# Patient Record
Sex: FEMALE | Race: WHITE
[De-identification: names, ages, dates, MRNs, and addresses within clinical notes are randomized per-mention and may not be internally consistent; named-entity substitution may affect disease eponyms.]

---

## 2018-07-27 ENCOUNTER — HOSPITAL ENCOUNTER (EMERGENCY)
Dept: HOSPITAL 74 - JERFT | Age: 78
Discharge: HOME | End: 2018-07-27
Payer: COMMERCIAL

## 2018-07-27 VITALS — HEART RATE: 86 BPM | SYSTOLIC BLOOD PRESSURE: 118 MMHG | TEMPERATURE: 97.8 F | DIASTOLIC BLOOD PRESSURE: 57 MMHG

## 2018-07-27 VITALS — BODY MASS INDEX: 26.2 KG/M2

## 2018-07-27 DIAGNOSIS — I10: ICD-10-CM
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
## 2018-07-27 DIAGNOSIS — S00.31XA: ICD-10-CM
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
## 2018-07-27 DIAGNOSIS — S52.592A: Primary | ICD-10-CM

## 2018-07-27 PROCEDURE — 2W3DX1Z IMMOBILIZATION OF LEFT LOWER ARM USING SPLINT: ICD-10-PCS

## 2018-07-27 NOTE — PDOC
Rapid Medical Evaluation


Time Seen by Provider: 07/27/18 16:04


Medical Evaluation: 





07/27/18 16:04


I have performed a brief in-person evaluation of this patient.





The patient presents with a chief complaint of: L wrist pain and facial 

abrasion s/p mechanical fall today. No LOC, ha, dizziness, n/v. Not on blood 

thinners. No hip/neck/pain. Ambulatory in triage. No CP or dizziness prior to 

fall





Pertinent physical exam findings:facial abrasion to L side of face, LUE w/ some 

swelling over dorsal aspect of distal L forearm





I have ordered the following:CTH and xray





The patient will proceed to the ED for further evaluation











**Discharge Disposition





- Diagnosis


Fall


Qualifiers:


 Encounter type: initial encounter Qualified Code(s): W19.XXXA - Unspecified 

fall, initial encounter








- Referrals





- Patient Instructions





- Post Discharge Activity

## 2018-07-27 NOTE — PDOC
History of Present Illness





- General


Chief Complaint: Bone Injury


Stated Complaint: FALL, INJURY


Time Seen by Provider: 07/27/18 16:04


History Source: Patient


Exam Limitations: No Limitations





- History of Present Illness


Initial Comments: 





07/27/18 16:35


77 yr female tripped and fell this am injured left wrist and left side face. no 

LOC no dizzyness. 


Occurred: reports: this morning


Severity: reports: mild


Pain Location: reports: upper extremity (left wrist)


Method of Injury: Yes: fall





Past History





- Past Medical History


Allergies/Adverse Reactions: 


 Allergies











Allergy/AdvReac Type Severity Reaction Status Date / Time


 


No Known Allergies Allergy   Verified 07/27/18 16:47











Home Medications: 


Ambulatory Orders





Metoprolol Succinate 25 mg PO ASDIR 07/27/18 








COPD: No


HTN: Yes





- Suicide/Smoking/Psychosocial Hx


Smoking History: Former smoker


Have you smoked in the past 12 months: No


If you are a former smoker, when did you quit?: 8 years ago


Information on smoking cessation initiated: No


Hx Alcohol Use: No


Drug/Substance Use Hx: No





**Review of Systems





- Review of Systems


Able to Perform ROS?: Yes


Is the patient limited English proficient: No


Constitutional: No: Symptoms Reported


HEENTM: No: Symptoms Reported


Respiratory: No: Symptoms reported


Cardiac (ROS): No: Symptoms Reported


ABD/GI: No: Symptoms Reported


Musculoskeletal: Yes: Symptoms Reported





*Physical Exam





- Vital Signs


 Last Vital Signs











Temp Pulse Resp BP Pulse Ox


 


 97.8 F   86   16   118/57   96 


 


 07/27/18 16:07  07/27/18 16:07  07/27/18 16:07  07/27/18 16:07  07/27/18 16:07














- Physical Exam


General Appearance: Yes: Nourished, Appropriately Dressed


HEENT: positive: EOMI, JEANNINE, TMs Normal, Pharynx Normal


Neck: positive: Supple.  negative: Lymphadenopathy (R), Lymphadenopathy (L), 

Tender lateral


Respiratory/Chest: positive: Lungs Clear, Normal Breath Sounds.  negative: 

Chest Tender, Stridor, Wheezing


Cardiovascular: positive: Regular Rhythm, Regular Rate


Extremity: positive: Normal Capillary Refill, Normal Inspection, Tender, 

Swelling (left wrist stronf radial pulse limited ROM )


Integumentary: positive: Normal Color, Dry, Warm, Bruising, Other (abrasions 

left cheek, left upper eyeborw )





Procedures





- Splinting


Hand-Made Type: orthoglass (left wirst volar/dorsal splint placed above elbow)





Medical Decision Making





- Medical Decision Making





07/27/18 16:49


cc: mechanical fall on tree root on sidewalk this am no loc


pt has applied ice to the left side of face cleaned with peroxide and appied 

bacitracin at home


pt has pain 7/10 to the left wrist with minimal movement





xrays done, head ct ordered, 


07/27/18 18:08


ct done is negative


dc inst given to pt who understands the plan of care all questions asked and 

answered. 





*DC/Admit/Observation/Transfer


Diagnosis at time of Disposition: 


 Abrasion, face w/o infection





Fall


Qualifiers:


 Encounter type: initial encounter Qualified Code(s): W19.XXXA - Unspecified 

fall, initial encounter





Wrist fracture, left


Qualifiers:


 Encounter type: initial encounter Fracture type: closed Qualified Code(s): 

S62.102A - Fracture of unspecified carpal bone, left wrist, initial encounter 

for closed fracture








- Discharge Dispostion


Disposition: HOME


Condition at time of disposition: Good





- Referrals


Referrals: 


Daquan Akhtar MD [Staff Physician] - 





- Patient Instructions


Additional Instructions: 


call  's office on Monday for appointment next week 


do not remove the splint or get it wet


use the sling while awake remove to sleep and bathe 


take tylenol 650mg every 4-6hrs for pain 


follow with your primary care doctor on MONDAY 





- Post Discharge Activity

## 2019-09-26 ENCOUNTER — HOSPITAL ENCOUNTER (OUTPATIENT)
Dept: HOSPITAL 74 - JER | Age: 79
Setting detail: OBSERVATION
Discharge: HOME | End: 2019-09-26
Attending: INTERNAL MEDICINE | Admitting: INTERNAL MEDICINE
Payer: COMMERCIAL

## 2019-09-26 VITALS — BODY MASS INDEX: 24.8 KG/M2

## 2019-09-26 VITALS — HEART RATE: 78 BPM | TEMPERATURE: 98.3 F | SYSTOLIC BLOOD PRESSURE: 119 MMHG | DIASTOLIC BLOOD PRESSURE: 55 MMHG

## 2019-09-26 DIAGNOSIS — Z87.891: ICD-10-CM

## 2019-09-26 DIAGNOSIS — I10: ICD-10-CM

## 2019-09-26 DIAGNOSIS — E78.5: ICD-10-CM

## 2019-09-26 DIAGNOSIS — J44.1: Primary | ICD-10-CM

## 2019-09-26 LAB
ALBUMIN SERPL-MCNC: 3.2 G/DL (ref 3.4–5)
ALP SERPL-CCNC: 96 U/L (ref 45–117)
ALT SERPL-CCNC: 23 U/L (ref 13–61)
ANION GAP SERPL CALC-SCNC: 9 MMOL/L (ref 8–16)
AST SERPL-CCNC: 38 U/L (ref 15–37)
BASOPHILS # BLD: 0.5 % (ref 0–2)
BILIRUB SERPL-MCNC: 0.6 MG/DL (ref 0.2–1)
BNP SERPL-MCNC: 332.4 PG/ML (ref 5–450)
BUN SERPL-MCNC: 22.9 MG/DL (ref 7–18)
CALCIUM SERPL-MCNC: 9.2 MG/DL (ref 8.5–10.1)
CHLORIDE SERPL-SCNC: 106 MMOL/L (ref 98–107)
CO2 SERPL-SCNC: 22 MMOL/L (ref 21–32)
CREAT SERPL-MCNC: 1.2 MG/DL (ref 0.55–1.3)
DEPRECATED RDW RBC AUTO: 14.1 % (ref 11.6–15.6)
EOSINOPHIL # BLD: 2.9 % (ref 0–4.5)
GLUCOSE SERPL-MCNC: 100 MG/DL (ref 74–106)
HCT VFR BLD CALC: 41 % (ref 32.4–45.2)
HGB BLD-MCNC: 13.3 GM/DL (ref 10.7–15.3)
INR BLD: 1.19 (ref 0.83–1.09)
LYMPHOCYTES # BLD: 14.8 % (ref 8–40)
MAGNESIUM SERPL-MCNC: 2 MG/DL (ref 1.8–2.4)
MCH RBC QN AUTO: 28 PG (ref 25.7–33.7)
MCHC RBC AUTO-ENTMCNC: 32.5 G/DL (ref 32–36)
MCV RBC: 86 FL (ref 80–96)
MONOCYTES # BLD AUTO: 13.4 % (ref 3.8–10.2)
NEUTROPHILS # BLD: 68.4 % (ref 42.8–82.8)
PH BLDV: 7.38 [PH] (ref 7.31–7.41)
PLATELET # BLD AUTO: 280 K/MM3 (ref 134–434)
PMV BLD: 7.4 FL (ref 7.5–11.1)
POTASSIUM SERPLBLD-SCNC: 4.2 MMOL/L (ref 3.5–5.1)
PROT SERPL-MCNC: 7.8 G/DL (ref 6.4–8.2)
PT PNL PPP: 14.1 SEC (ref 9.7–13)
RBC # BLD AUTO: 4.76 M/MM3 (ref 3.6–5.2)
SODIUM SERPL-SCNC: 137 MMOL/L (ref 136–145)
VENOUS PC02: 39.2 MMHG (ref 38–52)
VENOUS PO2: < 49 MMHG (ref 28–48)
WBC # BLD AUTO: 6.8 K/MM3 (ref 4–10)

## 2019-09-26 PROCEDURE — 3E0F7GC INTRODUCTION OF OTHER THERAPEUTIC SUBSTANCE INTO RESPIRATORY TRACT, VIA NATURAL OR ARTIFICIAL OPENING: ICD-10-PCS | Performed by: INTERNAL MEDICINE

## 2019-09-26 PROCEDURE — 3E033NZ INTRODUCTION OF ANALGESICS, HYPNOTICS, SEDATIVES INTO PERIPHERAL VEIN, PERCUTANEOUS APPROACH: ICD-10-PCS | Performed by: INTERNAL MEDICINE

## 2019-09-26 PROCEDURE — G0378 HOSPITAL OBSERVATION PER HR: HCPCS

## 2019-09-26 PROCEDURE — 3E0337Z INTRODUCTION OF ELECTROLYTIC AND WATER BALANCE SUBSTANCE INTO PERIPHERAL VEIN, PERCUTANEOUS APPROACH: ICD-10-PCS | Performed by: INTERNAL MEDICINE

## 2019-09-26 RX ADMIN — IPRATROPIUM BROMIDE AND ALBUTEROL SULFATE SCH AMP: .5; 3 SOLUTION RESPIRATORY (INHALATION) at 14:45

## 2019-09-26 RX ADMIN — IPRATROPIUM BROMIDE AND ALBUTEROL SULFATE SCH AMP: .5; 3 SOLUTION RESPIRATORY (INHALATION) at 14:15

## 2019-09-26 NOTE — PN
Teaching Attending Note


Name of Resident: Benson Stovall





ATTENDING PHYSICIAN STATEMENT





I saw and evaluated the patient.


I reviewed the resident's note and discussed the case with the resident.


I agree with the resident's findings and plan as documented with exceptions 

below.








SUBJECTIVE:


79 yof with PMHx of Ashthma, prior heavy smoker, COPD, (no prior h/o PNA/exac 

or hospitalization or need for home oxygen), HTN comes with progressive dyspnea 

with exertion, improved with rest. reports overall generalized chest tightness 

that improves with inhalers.


Also c/o seasonal allergies. No fevers, chills, cough, sputum, sick contacts, 

chest pain, leg swelling, weight gain, orthopnea, PND or new concerns. 


Currently feels much better after receiving treatment in the ED and eager to go 

home.


12 point ROS done, neg except above. 


last seen her pulmonologist Dr. Reyes 1 year ago. 





OBJECTIVE:


 Vital Signs











 Period  Temp  Pulse  Resp  BP Sys/Pryor  Pulse Ox


 


 Last 24 Hr  98.2 F    21  120/64  95-96








 Intake & Output











 09/23/19 09/24/19 09/25/19 09/26/19





 23:59 23:59 23:59 23:59


 


Weight    136 lb











GENERAL: Awake, alert, and fully oriented, in no acute distress, able to talk 

in full sentences, no use of accessory muscles of respiration. No dyspnea or 

tachypnea noted after ambulation in the ED, oxygenating well. 


HEAD: Normal with no signs of trauma.


EYES: Pupils equal, round and reactive to light, extraocular movements intact, 

sclera anicteric, conjunctiva clear. No lid lag.


EARS, NOSE, THROAT: Ears normal, nares patent, oropharynx clear without 

exudates. Moist mucous membranes.


NECK: Normal range of motion, supple without lymphadenopathy, JVD, or masses.


LUNGS: Breath sounds equal, clear to auscultation bilaterally. No wheezes, few 

basilar rales. No accessory muscle use.


HEART: Regular rate and rhythm, normal S1 and S2 without murmur, rub or gallop.


ABDOMEN: Soft, nontender, not distended, normoactive bowel sounds, no guarding, 

no rebound, no masses.  No hepatomegaly or  splenomegaly. 


MUSCULOSKELETAL: Normal range of motion at all joints. No bony deformities or 

tenderness. No CVA tenderness.


UPPER EXTREMITIES: 2+ pulses, warm, well-perfused. No cyanosis. No clubbing. No 

peripheral edema.


LOWER EXTREMITIES: 2+ pulses, warm, well-perfused. No calf tenderness. No 

peripheral edema. 


NEUROLOGICAL:  AAOX3, facial symmetry, tongue midline, EOMI, PERRL, power 5/5, 

sensation intact to light touch, Cranial nerves II-XII intact. Normal speech. 

Normal gait.


PSYCHIATRIC: Cooperative. Good eye contact. Appropriate mood and affect.


SKIN: Warm, dry, normal turgor, no rashes or lesions noted, normal capillary 

refill. 





 Home Medications











 Medication  Instructions  Recorded


 


Albuterol Sulfate [Proair Hfa] 8.5 gm IH Q6H PRN #1 hfa.aer.ad 09/26/19


 


Amlodipine Besylate [Norvasc -] 5 mg PO DAILY 09/26/19


 


Enalapril Maleate [Vasotec] 20 mg PO DAILY 09/26/19


 


Fluticasone/Vilanterol [Breo 1 each IH DAILY #1 blst.w.dev 09/26/19





Ellipta 100-25 Mcg INH]  


 


Metoprolol Succinate [Toprol Xl] 50 mg PO DAILY 09/26/19


 


Pravastatin Sodium 20 mg PO HS 09/26/19


 


Prednisone See Taper PO DAILY #20 tablet 09/26/19








Active Medications





Albuterol Sulfate (Ventolin 0.083% Nebulizer Soln -)  1 amp NEB Q4H PRN


   PRN Reason: SHORT OF BREATH/WHEEZING


Albuterol/Ipratropium (Duoneb -)  1 amp NEB RTID IVANA


Prednisone (Deltasone -)  40 mg PO DAILY [facility], 1928–1983





 Laboratory Results - last 24 hr











  09/26/19 09/26/19 09/26/19





  12:48 12:48 12:48


 


WBC  6.8  


 


RBC  4.76  


 


Hgb  13.3  


 


Hct  41.0  


 


MCV  86.0  


 


MCH  28.0  


 


MCHC  32.5  


 


RDW  14.1  


 


Plt Count  280  


 


MPV  7.4 L  


 


Absolute Neuts (auto)  4.7  


 


Neutrophils %  68.4  


 


Lymphocytes %  14.8  


 


Monocytes %  13.4 H  


 


Eosinophils %  2.9  


 


Basophils %  0.5  


 


Nucleated RBC %  0  


 


PT with INR   


 


INR   


 


VBG pH   


 


POC VBG pCO2   


 


POC VBG pO2   


 


VBG HCO3   


 


VBG O2 Sat (Sara)   


 


VBG Base Excess   


 


Sodium   137 


 


Potassium   4.2 


 


Chloride   106 


 


Carbon Dioxide   22 


 


Anion Gap   9 


 


BUN   22.9 H 


 


Creatinine   1.2 


 


Est GFR (CKD-EPI)AfAm   49.43 


 


Est GFR (CKD-EPI)NonAf   42.65 


 


Random Glucose   100 


 


Calcium   9.2 


 


Magnesium    2.0


 


Total Bilirubin   0.6 


 


AST   38 H 


 


ALT   23 


 


Alkaline Phosphatase   96 


 


Creatine Kinase    104


 


Troponin I    < 0.02


 


B-Natriuretic Peptide    332.4


 


Total Protein   7.8 


 


Albumin   3.2 L 














  09/26/19 09/26/19





  12:48 12:48


 


WBC  


 


RBC  


 


Hgb  


 


Hct  


 


MCV  


 


MCH  


 


MCHC  


 


RDW  


 


Plt Count  


 


MPV  


 


Absolute Neuts (auto)  


 


Neutrophils %  


 


Lymphocytes %  


 


Monocytes %  


 


Eosinophils %  


 


Basophils %  


 


Nucleated RBC %  


 


PT with INR  14.10 H 


 


INR  1.19 H 


 


VBG pH   7.38


 


POC VBG pCO2   39.2


 


POC VBG pO2   < 49 H


 


VBG HCO3   22.5 L


 


VBG O2 Sat (Sara)   66.4 L


 


VBG Base Excess   -1.9


 


Sodium  


 


Potassium  


 


Chloride  


 


Carbon Dioxide  


 


Anion Gap  


 


BUN  


 


Creatinine  


 


Est GFR (CKD-EPI)AfAm  


 


Est GFR (CKD-EPI)NonAf  


 


Random Glucose  


 


Calcium  


 


Magnesium  


 


Total Bilirubin  


 


AST  


 


ALT  


 


Alkaline Phosphatase  


 


Creatine Kinase  


 


Troponin I  


 


B-Natriuretic Peptide  


 


Total Protein  


 


Albumin  








CXR image and results reviewed


Prior CT chest from 2/2019 reviewed


EKG: NSR, PAC, no acute ST-T changes





ASSESSMENT AND PLAN:


79 yof with PMHx of asthma, COPD, prior heavy smoker, HTN, lost to pulmonary 

follow up, comes with seasonal allergies and progressive exertional dyspnea/

wheezing





-Exertional dyspnea, likely COPD exac+/- season allergies


-HTN


-Ex heavy smoker





Plan


Markedly improved after steroids and nebs in the ED. Currently asymptomatic. 


Oxygenating well, lung exam clear.


No fevers, or s/s concerning for infection


Imaging non concerning. 


Ambulating well in the ED


renew home albuterol/breo-ellipta.


Discussed with Dr. Reyes, 


will place on short steroid taper. 


Patient counseled on outpatient pulmonary follow up and PCP In 1 week


Presentation findings, improvement with nebs/steroids and overall presentation 

consistent with COPD rather than cardiac process.


patient feels well, wishing to go home.


d/c home with appropriate inhaler and steroid scripts


Discussed with patient, ED Rn. 


Total time spent 55 min.

## 2019-09-26 NOTE — DS
Physical Exam: 


SUBJECTIVE:  SEE H&P subjective. Pt wishing to go home and feels comfortable to 

do so.








OBJECTIVE:





 Vital Signs











 Period  Temp  Pulse  Resp  BP Sys/Pryor  Pulse Ox


 


 Last 24 Hr  98.2 F    21  120/64  95-96








PHYSICAL EXAM





GENERAL: Awake, alert, and fully oriented, in no acute distress. Able to speak 

in full sentences on RA


HEENT: NC/AT, EOMI, JAKOB, sclera anicteric, MMM, no nasal polyps


NECK: No JVD


LUNGS: CTA bilaterally including down to bases. Breathing at normal rate. No 

wheezes, and no crackles. No accessory muscle use. 93% SpO2 on RA. Ambulated 

with shortness of breath in ED


HEART: RRR, normal S1 and S2 without murmur 


ABDOMEN: Soft, NT/ND, normoactive bowel sounds, no guarding 


MUSCULOSKELETAL: No CVA tenderness. 


EXTREMITIES: 2+ pulses, warm, well-perfused. No calf tenderness. No peripheral 

edema. No clubbing. No cyanosis


PSYCHIATRIC: Cooperative. Good eye contact. Appropriate mood and affect.


SKIN: Warm, dry, no rashes or lesions noted 





LABS


 Laboratory Results - last 24 hr











  09/26/19 09/26/19 09/26/19





  12:48 12:48 12:48


 


WBC  6.8  


 


RBC  4.76  


 


Hgb  13.3  


 


Hct  41.0  


 


MCV  86.0  


 


MCH  28.0  


 


MCHC  32.5  


 


RDW  14.1  


 


Plt Count  280  


 


MPV  7.4 L  


 


Absolute Neuts (auto)  4.7  


 


Neutrophils %  68.4  


 


Lymphocytes %  14.8  


 


Monocytes %  13.4 H  


 


Eosinophils %  2.9  


 


Basophils %  0.5  


 


Nucleated RBC %  0  


 


PT with INR   


 


INR   


 


VBG pH   


 


POC VBG pCO2   


 


POC VBG pO2   


 


VBG HCO3   


 


VBG O2 Sat (Sara)   


 


VBG Base Excess   


 


Sodium   137 


 


Potassium   4.2 


 


Chloride   106 


 


Carbon Dioxide   22 


 


Anion Gap   9 


 


BUN   22.9 H 


 


Creatinine   1.2 


 


Est GFR (CKD-EPI)AfAm   49.43 


 


Est GFR (CKD-EPI)NonAf   42.65 


 


Random Glucose   100 


 


Calcium   9.2 


 


Magnesium    2.0


 


Total Bilirubin   0.6 


 


AST   38 H 


 


ALT   23 


 


Alkaline Phosphatase   96 


 


Creatine Kinase    104


 


Troponin I    < 0.02


 


B-Natriuretic Peptide    332.4


 


Total Protein   7.8 


 


Albumin   3.2 L 














  09/26/19 09/26/19





  12:48 12:48


 


WBC  


 


RBC  


 


Hgb  


 


Hct  


 


MCV  


 


MCH  


 


MCHC  


 


RDW  


 


Plt Count  


 


MPV  


 


Absolute Neuts (auto)  


 


Neutrophils %  


 


Lymphocytes %  


 


Monocytes %  


 


Eosinophils %  


 


Basophils %  


 


Nucleated RBC %  


 


PT with INR  14.10 H 


 


INR  1.19 H 


 


VBG pH   7.38


 


POC VBG pCO2   39.2


 


POC VBG pO2   < 49 H


 


VBG HCO3   22.5 L


 


VBG O2 Sat (Sara)   66.4 L


 


VBG Base Excess   -1.9


 


Sodium  


 


Potassium  


 


Chloride  


 


Carbon Dioxide  


 


Anion Gap  


 


BUN  


 


Creatinine  


 


Est GFR (CKD-EPI)AfAm  


 


Est GFR (CKD-EPI)NonAf  


 


Random Glucose  


 


Calcium  


 


Magnesium  


 


Total Bilirubin  


 


AST  


 


ALT  


 


Alkaline Phosphatase  


 


Creatine Kinase  


 


Troponin I  


 


B-Natriuretic Peptide  


 


Total Protein  


 


Albumin  





IMAGING:





CXR: 2 views of the chest reveal clear lungs, sharp angles, tortuous thoracic 

aorta but no sign of


infiltrate or failure. The angles are sharp. The bones and soft tissues are 

intact. There are


degenerative changes with wedging. The mediastinum has a similar configuration 

to 2/28/ 2019. Correlation recommended





ECG: [QTc 493ms] *** POOR DATA QUALITY, INTERPRETATION MAY BE ADVERSELY AFFECTED


SINUS RHYTHM WITH PREMATURE ATRIAL COMPLEXES


OTHERWISE NORMAL ECG


WHEN COMPARED WITH ECG OF 24-DEC-2009 20:57,


PREMATURE VENTRICULAR COMPLEXES ARE NO LONGER PRESENT


PREMATURE ATRIAL COMPLEXES ARE NOW PRESENT





HOSPITAL COURSE:





Date of Admission:09/26/19





Date of Discharge: 09/26/19





Short-term stay due to interval improvement from original assessment until now. 

See plan below as per original H&P:





--CAT score of 19, however only worsened by 2 points since prior months as 

described by patient


--Unknown PFTs for GOLD criteria





--Pt markedly improved exam after ED interventions


--CXR reviewed without any infiltrative processes or congestion noted; only 

hyperinflated lungs


--No fevers, WBC WNL, no respiratory acidosis





--Discussed with her pulmonologist who recommended d/c with f/u home and 

Prednisone short taper in interim


--Will renew Albuterol HFA and Breo-Elipta inhalers


--Pt advised on the importance of outpatient followup and recommended f/u 

within 1 week alongside with interval PFT monitoring


--Recommended flu vaccination this year on OP basis





Diet: Unrestricted





Case discussed with Dr. Vazquez and Dr. Reyes and ED physicians


Benson Stovall, DO - IM PGY-3





Minutes to complete discharge: 30





Discharge Summary


Problems reviewed: Yes


Reason For Visit: ACUTE EXACERBATION OF COPD


Current Active Problems





COPD (chronic obstructive pulmonary disease) (Acute)


COPD exacerbation (Acute)








Condition: Stable





- Instructions


Diet, Activity, Other Instructions: 


You were seen due to have increased shortness of breath related to your COPD. 

It is important to go to your follow-up appointments so you can be optimized 

with medications to feel better





MEDICATIONS:


   Please continue your Breo Elipta 1 inhalation per day (this was sent to your 

pharmacy) 


   please continue your ProAir AS NEEDED for shortness of breath 1-2 puffs 

every 4 hours


   Please continue your Enalapril 20mg daily


   Continue your Metoprolol 50mg daily


   Continue your Amlodipine 5mg daily


   Continue your Pravastatin 20mg at night





   You will be given a Prednisone taper. It is important to continue this to 

the end as stopping it abruptly could harm you.


      --First day: 4 tablets (40mg) once


      --Second day: 4 tablets (40mg) once


      --Third day: 3 tablets (30mg) once


      --Fourth day: 3 tablets (30mg) once


      --Fifth day: 2 tablets (20mg) once


      --Sixth day: 2 tablets (20mg) once


      --Seventh day: 1 tablet (10mg) once


      --Eighth day: 1 tablets (10mg) once


   --Stop on the ninth day--





Follow-ups:


   Please follow-up with Dr. Martinez in 1 week to update him on your condition


   Please follow-up with Dr. Reyes within 1 week to update your pulmonary 

function tests and medications regarding your COPD 





If you notice any fevers, chills, new cough with productive cough, chest pain, 

worsening breathing or any new concerns, please call 911 or come to the ED. 





Referrals: 


Júnior Martinez MD [Primary Care Provider] - 1 Week


Kyle Reyes MD [Staff Physician] - 1 Week


Disposition: HOME





- Home Medications


Comprehensive Discharge Medication List: 


Ambulatory Orders





Albuterol Sulfate [Proair Hfa] 8.5 gm IH Q6H PRN #1 hfa.aer.ad 09/26/19 


Amlodipine Besylate [Norvasc -] 5 mg PO DAILY 09/26/19 


Enalapril Maleate [Vasotec] 20 mg PO DAILY 09/26/19 


Fluticasone/Vilanterol [Breo Ellipta 100-25 Mcg INH] 1 each IH DAILY #1 

blst.w.dev 09/26/19 


Metoprolol Succinate [Toprol Xl] 50 mg PO DAILY 09/26/19 


Pravastatin Sodium 20 mg PO HS 09/26/19 


Prednisone See Taper PO DAILY #20 tablet 09/26/19 








This patient is new to me today: Yes


Date on this admission: 09/26/19


Emergency Visit: Yes


ED Registration Date: 09/26/19


Care time: The patient presented to the Emergency Department on the above date 

and was hospitalized for further evaluation of their emergent condition.


Critical Care patient: No





- Discharge Referral


Referred to [facility] No

## 2019-09-26 NOTE — PDOC
Documentation entered by Zena Gallagher SCRIBE, acting as scribe for Beatris Darby MD.








Beatris Darby MD:  This documentation has been prepared by the Elliott pardo Adrianna, SCRIBE, under my direction and personally reviewed by me in its 

entirety.  I confirm that the documentation accurately reflects all work, 

treatment, procedures, and medical decision making performed by me.  





Attending Attestation





- Resident


Resident Name: Johnny Ornelas





- ED Attending Attestation


I have performed the following: I have examined & evaluated the patient, The 

case was reviewed & discussed with the resident, I agree w/resident's findings 

& plan





- HPI


HPI: 


The patient is an 79 year old female, with a significant PMH of HTN,  COPD, HCL

, and childhood asthma presenting with progressively worsening SOB for one 

month. Patient endorses dyspnea on exertion, noting she cant walk for more 

than one block. Patient notes she developed dyspnea at rest, prompting her 

visit to the ED. She reports associated dry cough and chest pain upon exertion.


usually takes proair for COPD.





09/26/19 13:03








- Physicial Exam


PE: 


Agree with the resident's HPI and PE as documented in the electronic medical 

record.





NAD, well appearing, EOMI, PERRL, nl conjunctiva, anicteric; no jvd. neck 

supple. mildly tachypneic in 20s, +basilar crackles, diminished breath sounds 

throughout. tachycardic, no murmurs, abdomen soft nontender. Back nontender. 

VILLA x4, no focal neuro deficits. No peripheral edema. normal color for ethnicity

, no calf tenderness. WWP.





09/26/19 13:02








- Medical Decision Making





09/26/19 12:32


See HPI for details. Prior notes reviewed, including admissions, discharges and 

consultations. 


Vital signs reviewed, wnl. 


Vital Signs











Temp Pulse Resp BP Pulse Ox


 


 98.2 F      21 H  120/64   95 


 


 09/26/19 11:49     09/26/19 11:49  09/26/19 11:49  09/26/19 11:49








DDx SOB: ACS, PE, PTX, CHF, COPD exac, pulmonary edema, pleurisy, pneumonia, 

viral syndrome. effusion. anemia, electrolyte/metabolic derangements.





POCUS thoracic exam with primarily A line profile b/l, left focal B lines in 

the apex, unclear etiology, but not diffuse; no pleural effusions noted.


suggestive of more copd exacerbation.





laboratory results and imaging reviewed, basic labs and lytes wnl, 


CXR_copd lungs, no infiltrate/edema/effusion or opacity


Cardiac panel_neg, reassuring, doubt cardiac.


EKG normal sinus rhythm at 89 bpm, no interval abnormalities, narrow QRS, ST 

and T wave segments and morphology normal. 





ED course


-interventions: duonebs x3, solumedrol, reassess





Admit for COPD exacerbation.. Discussed results and management plan with pt and 

family member at bedside, agree with impression, treatment indications, 

recommendations and plan. hospitalist service for admission.





09/26/19 13:03





09/26/19 13:35








**Heart Score/ECG Review


  ** #1


ECG reviewed & interpreted by me at: 11:55


General ECG Interpretation: Sinus Rhythm, Normal Rate, Normal Intervals


Compared to previous ECG there are: Previous ECG unavail





09/26/19 12:34


EKG normal sinus rhythm at 89 bpm, no interval abnormalities, narrow QRS, ST 

and T wave segments and morphology normal.

## 2019-09-26 NOTE — EKG
Test Reason : 

Blood Pressure : ***/*** mmHG

Vent. Rate : 089 BPM     Atrial Rate : 089 BPM

   P-R Int : 194 ms          QRS Dur : 090 ms

    QT Int : 406 ms       P-R-T Axes : 091 -23 066 degrees

   QTc Int : 493 ms

 

*** POOR DATA QUALITY, INTERPRETATION MAY BE ADVERSELY AFFECTED

SINUS RHYTHM WITH PREMATURE ATRIAL COMPLEXES

OTHERWISE NORMAL ECG

WHEN COMPARED WITH ECG OF 24-DEC-2009 20:57,

PREMATURE VENTRICULAR COMPLEXES ARE NO LONGER PRESENT

PREMATURE ATRIAL COMPLEXES ARE NOW PRESENT

Confirmed by ARIS MEDEL MD ([id number]) on 9/26/2019 4:19:40 PM

 

Referred By:             Confirmed By:ARIS MEDEL MD

## 2019-09-26 NOTE — PDOC
History of Present Illness





- General


Chief Complaint: Shortness of Breath


Stated Complaint: ASTHMA


Time Seen by Provider: 09/26/19 12:00


History Source: Patient


Exam Limitations: No Limitations





- History of Present Illness


Initial Comments: 








Toshia Michaud is an 80 yo F w a hx of COPD, HTN, HCL, and childhood asthma 

who presents to the ER with shortness of breath which has been worsening over 

the past month. The patient states it came to a point where she cannot breathe 

at rest. She has been experiencing significant dyspnea with exertion over the 

course of the past month. Whenever she walks one block she becomes short of 

breath and also has chest pain. She states she has been requiring an extra 

pillow to sleep with lately. Patient endorses a dry cough over the past week as 

well. The patient denies currently experiencing any chest pain. Her only 

complaint at the present time is difficulty breathing. 





Denies recent fevers, chills, infections. Denies ankle/leg swelling. Denies 

having a hx of heart failure. Denies nausea vomiting, diaphoresis. Denies 

current chest pain. Denies abdominal or back pain. 





PCP: Ynes Martinez


PSH: None reported


Social Hx: Foermer smoker. Denies current smoking, drinking, or substance abuse


Allergies: NKDA, NKA











Past History





- Past Medical History


Allergies/Adverse Reactions: 


 Allergies











Allergy/AdvReac Type Severity Reaction Status Date / Time


 


No Known Allergies Allergy   Verified 09/26/19 15:02











Home Medications: 


Ambulatory Orders





Albuterol Sulfate [Proair Hfa] 8.5 gm IH Q6H PRN #1 hfa.aer.ad 09/26/19 


Amlodipine Besylate [Norvasc -] 5 mg PO DAILY 09/26/19 


Enalapril Maleate [Vasotec] 20 mg PO DAILY 09/26/19 


Fluticasone/Vilanterol [Breo Ellipta 100-25 Mcg INH] 1 each IH DAILY #1 

blst.w.dev 09/26/19 


Metoprolol Succinate [Toprol Xl] 50 mg PO DAILY 09/26/19 


Pravastatin Sodium 20 mg PO HS 09/26/19 


Prednisone See Taper PO DAILY #20 tablet 09/26/19 








Cancer: Yes


COPD: No


HTN: Yes





- Immunization History


Immunization Up to Date: Yes





- Psycho Social/Smoking Cessation Hx


Smoking History: Former smoker


Have you smoked in the past 12 months: No


If you are a former smoker, when did you quit?: 8 years ago


Information on smoking cessation initiated: No


Hx Alcohol Use: No


Drug/Substance Use Hx: No





**Review of Systems





- Review of Systems


Able to Perform ROS?: Yes


Comments:: 








CONSTITUTIONAL: 


Present: weakness, malaise


Absent: fever, chills, diaphoresis, loss of appetite


HEENT: 


Absent: rhinorrhea, nasal congestion, throat pain, throat swelling, difficulty 

swallowing, mouth swelling, ear pain, eye pain, visual Changes


CARDIOVASCULAR: 


Present: Chest pain


Absent: syncope, palpitations, irregular heart rate, lightheadedness, 

peripheral edema


RESPIRATORY: 


Present: cough, shortness of breath, dyspnea with exertion, orthopnea


Absent: wheezing, stridor, hemoptysis


GASTROINTESTINAL:


Absent: abdominal pain, abdominal distension, nausea, vomiting, diarrhea, 

constipation, melena, hematochezia


GENITOURINARY: 


Absent: dysuria, frequency, urgency, hesitancy, hematuria, flank pain, genital 

pain


MUSCULOSKELETAL: 


Absent: myalgia, arthralgia, joint swelling


SKIN: 


Absent: rash, itching, pallor


HEMATOLOGIC/IMMUNOLOGIC: 


Absent: easy bleeding, easy bruising, lymphadenopathy, frequent infections


ENDOCRINE:


Absent: unexplained weight gain, unexplained weight loss, heat intolerance, 

cold intolerance


NEUROLOGIC: 


Absent: headache, focal weakness or paresthesias, dizziness, unsteady gait, 

seizure, mental status changes, bladder or bowel incontinence


PSYCHIATRIC: 


Absent: anxiety, depression, suicidal or homicidal ideation, hallucinations.











*Physical Exam





- Vital Signs


 Last Vital Signs











Temp Pulse Resp BP Pulse Ox


 


 98.2 F      21 H  120/64   95 


 


 09/26/19 11:49     09/26/19 11:49  09/26/19 11:49  09/26/19 11:49














- Physical Exam


Comments: 








GENERAL:


Well developed, well nourished. Awake and alert. Mild distress.


HEENT:


Normocephalic, atraumatic. PERRLA, EOMI. No conjunctival pallor. Sclera are non-

icteric. Moist mucous membranes. Oropharynx is clear.


NECK: 


Supple. Full ROM. No JVD. No thyromegaly. No lymphadenopathy.


CARDIOVASCULAR:


Tachycardic rate. Regular rhythm. No murmurs, rubs, or gallops. Distal pulses 

are 2+ and symmetric. 


PULMONARY: 


There are bilateral crackles at the bases. No wheezing or rhonchi.


ABDOMINAL:


Soft. Non-tender. Non-distended. No rebound or guarding. No organomegaly. 

Normoactive bowel sounds. 


MUSCULOSKELETAL 


Normal range of motion at all joints. No bony deformities or tenderness. No CVA 

tenderness.


EXTREMITIES: 


1+ b/l edema. No cyanosis. No clubbing. No calf tenderness.


SKIN: 


Warm and dry. Normal capillary refill. No rashes. No jaundice. 


NEUROLOGICAL: 


Alert, awake, appropriate. Normal speech. Gait is normal without ataxia.


PSYCHIATRIC: 


Cooperative. Good eye contact. Appropriate mood and affect.











Procedures





- Bedside Ultrasound


Bedside Ultrasound: Lung


Other: Cardiac


Remarks: 








Lungs: Trace B-lines in right apex. Normal A-lines in both lung fields. Normal 

lung sliding. No pleural effusion. 





Cardiac: No focal wall motion abnormalities, no pericardial effusion, normal LV/

RV ratio

















ED Treatment Course





- LABORATORY


CBC & Chemistry Diagram: 


 09/26/19 12:48





 09/26/19 12:48





- RADIOLOGY


Radiograph Interpretation: 





CXR:  Chest: Wheezing. 2 views of the chest reveal clear lungs, sharp angles, 

tortuous thoracic aorta but no sign of infiltrate or failure. The angles are 

sharp. The bones and soft tissues are intact. There are degenerative changes 

with wedging. The mediastinum has a similar configuration to 2/28/2019. 

Correlation recommended 








Medical Decision Making





- Medical Decision Making





Toshia Michaud is an 80 yo F w a hx of COPD, HTN, HCL, and childhood asthma 

who presents to the ER with shortness of breath which has been worsening over 

the past month. The patient states it came to a point where she cannot breathe 

at rest. She has been experiencing significant dyspnea with exertion over the 

course of the past month. Whenever she walks one block she becomes short of 

breath and also has chest pain. She states she has been requiring an extra 

pillow to sleep with lately. Patient endorses a dry cough over the past week as 

well. The patient denies currently experiencing any chest pain. Her only 

complaint at the present time is difficulty breathing. 





VS: Patient is satting at 95% on RA


- Tachycardic to 115





DDx IBNLT: COPD vs CHF, less likely asthma. ACS/MI, electrolyte/metabolic 

disturbance, angina





Plan: Labs, urine, CXR, EKG, bedside POCUS heart, lung, admit to hospital





EKG: NS rate of 89, narrow complexes, normal axis, no hypertrophy, no ST 

elevations or depressions, no Q waves, no abnormal TWI's





Labs: Elevated BUN. Normal trop and BNP. 


- Hydrating w 1LNS





US - 


Lungs: Trace B-lines on the left. Normal A-lines, no pleural effusion. 


Cardio: No focal wall motion abnormalities. No pericardial effusion. Normal Rv/

Lv ratio. Grossly Normal echo. 





CXR: Unremarkable





Dispo: Admit to hospital for respiratory distress

















Discharge





- Discharge Information


Problems reviewed: Yes


Clinical Impression/Diagnosis: 


 COPD exacerbation





COPD (chronic obstructive pulmonary disease)


Qualifiers:


 COPD type: unspecified COPD Qualified Code(s): J44.9 - Chronic obstructive 

pulmonary disease, unspecified





Condition: Stable





- Admission


Yes





- Follow up/Referral





- Patient Discharge Instructions





- Post Discharge Activity

## 2019-09-26 NOTE — HP
CHIEF COMPLAINT: Shortness of breath


PCP: Dr. Martinez





HISTORY OF PRESENT ILLNESS:


   80 yo F with h/o of COPD (unknown GOLD stage), HTN, HLD who originally 

presented due to increased shortness of breath. Pt describes increased 

shortness of breath over the past 1-2 months. Her reason for ER visit today was 

due to waking up short of breath which caused her concern. She noticed how her 

daily activities are becoming somewhat limited such as going to the grocery 

store. In addition she notices how climbing one stair flight or walking one 

block will get her short of breath. She has seen Dr. Reyes 1 year prior 

for her pulmonology needs, however has not followed up because she didn't want 

to. Pt normally takes ProAir HFA PRN for shortness of breath, however she has 

been using it about 1-2 times per day and noticing diminishing returns. She has 

just run out of her ProAir within the past day or so as well. Pt also is 

supposed to be on Breo-Elipta, however pt again has ran out of her medications 

and not followed up with any doctors. Pt noted interrupted sleep and uses 2 

pillows when she is in bed as well. Pt notes clear productive cough however 

this is infrequent. Pt also reports seasonal allergies around now, has not been 

in contact with any ill people, and has not received the flu vaccination this 

year. 





Currently pt received Medrol 125mg x1 in the ED which she reports has made her 

feel better. She denies any lightheadedness, dizziness, blurred vision, 

headaches, rhinorrhea, change in frequency or colour of sputum, odynophagia, 

chest pain, palpitations, back pain, abdominal pain, dysuria, polyuria, 

hematuria, diarrhea, constipation, melena, weakness, numbness.








Recent Travel: Denies





PAST MEDICAL HISTORY:


   COPD (Unknown GOLD due to unknown PFTs)


   HTN


   HLD





PAST SURGICAL HISTORY:


   None





Social History:


Smoking: former smoker from [age]yo about 1PPD


Alcohol: Denies


Drugs: Denies


Retired, has a pet cockatiel (bird) at home, no environmental exposures noted





Allergies


No Known Allergies Allergy (Verified 09/26/19 15:02)


 


Family Hx: Noncontributory





HOME MEDICATIONS:


 Home Medications











 Medication  Instructions  Recorded


 


Albuterol Sulfate [Proair Hfa] 8.5 gm IH Q6H PRN #1 hfa.aer.ad 09/26/19


 


Amlodipine Besylate [Norvasc -] 5 mg PO DAILY 09/26/19


 


Enalapril Maleate [Vasotec] 20 mg PO DAILY 09/26/19


 


Fluticasone/Vilanterol [Breo 1 each IH DAILY #1 blst.w.dev 09/26/19





Ellipta 100-25 Mcg INH]  


 


Metoprolol Succinate [Toprol Xl] 50 mg PO DAILY 09/26/19


 


Pravastatin Sodium 20 mg PO HS 09/26/19


 


Prednisone See Taper PO DAILY #20 tablet 09/26/19








REVIEW OF SYSTEMS


As per HPI








PHYSICAL EXAMINATION


 Vital Signs - 24 hr











  09/26/19 09/26/19





  11:49 14:15


 


Temperature 98.2 F 


 


Respiratory 16 





Rate  


 


Blood Pressure 120/64 


 


O2 Sat by Pulse 95 96





Oximetry (%)  











GENERAL: Awake, alert, and fully oriented, in no acute distress. Able to speak 

in full sentences on RA


HEENT: NC/AT, EOMI, JAKOB, sclera anicteric, MMM, no nasal polyps


NECK: No JVD


LUNGS: CTA bilaterally including down to bases. Breathing at normal rate. No 

wheezes, and no crackles. No accessory muscle use. 93% SpO2 on RA. Ambulated 

with shortness of breath in ED


HEART: RRR, normal S1 and S2 without murmur 


ABDOMEN: Soft, NT/ND, normoactive bowel sounds, no guarding 


MUSCULOSKELETAL: No CVA tenderness. 


EXTREMITIES: 2+ pulses, warm, well-perfused. No calf tenderness. No peripheral 

edema. No clubbing. No cyanosis


PSYCHIATRIC: Cooperative. Good eye contact. Appropriate mood and affect.


SKIN: Warm, dry, no rashes or lesions noted 


 Laboratory Results











  09/26/19 09/26/19 09/26/19





  12:48 12:48 12:48


 


WBC  6.8  


 


RBC  4.76  


 


Hgb  13.3  


 


Hct  41.0  


 


MCV  86.0  


 


MCH  28.0  


 


MCHC  32.5  


 


RDW  14.1  


 


Plt Count  280  


 


MPV  7.4 L  


 


Absolute Neuts (auto)  4.7  


 


Neutrophils %  68.4  


 


Lymphocytes %  14.8  


 


Monocytes %  13.4 H  


 


Eosinophils %  2.9  


 


Basophils %  0.5  


 


Nucleated RBC %  0  


 


PT with INR   


 


INR   


 


VBG pH   


 


POC VBG pCO2   


 


POC VBG pO2   


 


VBG HCO3   


 


VBG O2 Sat (Sara)   


 


VBG Base Excess   


 


Sodium   137 


 


Potassium   4.2 


 


Chloride   106 


 


Carbon Dioxide   22 


 


Anion Gap   9 


 


BUN   22.9 H 


 


Creatinine   1.2 


 


Est GFR (CKD-EPI)AfAm   49.43 


 


Est GFR (CKD-EPI)NonAf   42.65 


 


Random Glucose   100 


 


Calcium   9.2 


 


Magnesium    2.0


 


Total Bilirubin   0.6 


 


AST   38 H 


 


ALT   23 


 


Alkaline Phosphatase   96 


 


Creatine Kinase    104


 


Troponin I    < 0.02


 


B-Natriuretic Peptide    332.4


 


Total Protein   7.8 


 


Albumin   3.2 L 














  09/26/19 09/26/19





  12:48 12:48


 


WBC  


 


RBC  


 


Hgb  


 


Hct  


 


MCV  


 


MCH  


 


MCHC  


 


RDW  


 


Plt Count  


 


MPV  


 


Absolute Neuts (auto)  


 


Neutrophils %  


 


Lymphocytes %  


 


Monocytes %  


 


Eosinophils %  


 


Basophils %  


 


Nucleated RBC %  


 


PT with INR  14.10 H 


 


INR  1.19 H 


 


VBG pH   7.38


 


POC VBG pCO2   39.2


 


POC VBG pO2   < 49 H


 


VBG HCO3   22.5 L


 


VBG O2 Sat (Sara)   66.4 L


 


VBG Base Excess   -1.9


 


Sodium  


 


Potassium  


 


Chloride  


 


Carbon Dioxide  


 


Anion Gap  


 


BUN  


 


Creatinine  


 


Est GFR (CKD-EPI)AfAm  


 


Est GFR (CKD-EPI)NonAf  


 


Random Glucose  


 


Calcium  


 


Magnesium  


 


Total Bilirubin  


 


AST  


 


ALT  


 


Alkaline Phosphatase  


 


Creatine Kinase  


 


Troponin I  


 


B-Natriuretic Peptide  


 


Total Protein  


 


Albumin  











ASSESSMENT/PLAN:


Chronic COPD with advancement of disease


HTN


Former smoker 


HLD





--CAT score of 19, however only worsened by 2 points since prior months as 

described by patient


--Unknown PFTs for GOLD criteria





--Pt markedly improved exam after ED interventions


--CXR reviewed without any infiltrative processes or congestion noted; only 

hyperinflated lungs


--No fevers, WBC WNL, no respiratory acidosis





--Discussed with her pulmonologist who recommended d/c with f/u home and 

Prednisone short taper in interim


--Will renew Albuterol HFA and Breo-Elipta inhalers


--Pt advised on the importance of outpatient followup and recommended f/u 

within 1 week alongside with interval PFT monitoring


--Recommended flu vaccination this year on OP basis





Diet: Unrestricted





Case discussed with Dr. Vazquez and Dr. Reyes and ED physicians


Benson Stovall, DO - IM PGY-3








Visit type





- Emergency Visit


Emergency Visit: Yes


ED Registration Date: 09/26/19


Care time: The patient presented to the Emergency Department on the above date 

and was hospitalized for further evaluation of their emergent condition.





- New Patient


This patient is new to me today: Yes


Date on this admission: 09/26/19





- Critical Care


Critical Care patient: No

## 2021-10-12 ENCOUNTER — HOSPITAL ENCOUNTER (OUTPATIENT)
Dept: HOSPITAL 74 - JASU-SURG | Age: 81
Discharge: HOME | End: 2021-10-12
Attending: OPHTHALMOLOGY
Payer: COMMERCIAL

## 2021-10-12 VITALS — DIASTOLIC BLOOD PRESSURE: 80 MMHG | SYSTOLIC BLOOD PRESSURE: 150 MMHG | HEART RATE: 88 BPM

## 2021-10-12 VITALS — BODY MASS INDEX: 25.3 KG/M2

## 2021-10-12 VITALS — TEMPERATURE: 98.8 F

## 2021-10-12 DIAGNOSIS — H26.9: Primary | ICD-10-CM

## 2021-10-12 PROCEDURE — 08RK3JZ REPLACEMENT OF LEFT LENS WITH SYNTHETIC SUBSTITUTE, PERCUTANEOUS APPROACH: ICD-10-PCS | Performed by: OPHTHALMOLOGY

## 2021-10-12 RX ADMIN — FLURBIPROFEN SODIUM SCH DROP: 0.3 SOLUTION/ DROPS OPHTHALMIC at 08:34

## 2021-10-12 RX ADMIN — CYCLOPENTOLATE HYDROCHLORIDE SCH DROP: 10 SOLUTION/ DROPS OPHTHALMIC at 08:48

## 2021-10-12 RX ADMIN — CIPROFLOXACIN HYDROCHLORIDE SCH DROP: 3 SOLUTION/ DROPS OPHTHALMIC at 08:48

## 2021-10-12 RX ADMIN — PHENYLEPHRINE HYDROCHLORIDE ONE DROP: 25 SOLUTION/ DROPS OPHTHALMIC at 08:48

## 2021-10-12 RX ADMIN — CIPROFLOXACIN HYDROCHLORIDE SCH DROP: 3 SOLUTION/ DROPS OPHTHALMIC at 08:34

## 2021-10-12 RX ADMIN — TROPICAMIDE SCH DROP: 10 SOLUTION/ DROPS OPHTHALMIC at 08:49

## 2021-10-12 RX ADMIN — PHENYLEPHRINE HYDROCHLORIDE ONE DROP: 25 SOLUTION/ DROPS OPHTHALMIC at 08:35

## 2021-10-12 RX ADMIN — FLURBIPROFEN SODIUM SCH DROP: 0.3 SOLUTION/ DROPS OPHTHALMIC at 08:48

## 2021-10-12 RX ADMIN — TROPICAMIDE SCH DROP: 10 SOLUTION/ DROPS OPHTHALMIC at 08:35

## 2021-10-12 RX ADMIN — CYCLOPENTOLATE HYDROCHLORIDE SCH DROP: 10 SOLUTION/ DROPS OPHTHALMIC at 08:34
